# Patient Record
Sex: FEMALE | Race: WHITE
[De-identification: names, ages, dates, MRNs, and addresses within clinical notes are randomized per-mention and may not be internally consistent; named-entity substitution may affect disease eponyms.]

---

## 2020-02-17 ENCOUNTER — HOSPITAL ENCOUNTER (OUTPATIENT)
Dept: HOSPITAL 46 - DS | Age: 65
Discharge: HOME | End: 2020-02-17
Attending: SPECIALIST
Payer: COMMERCIAL

## 2020-02-17 VITALS — HEIGHT: 65 IN | BODY MASS INDEX: 34.99 KG/M2 | WEIGHT: 209.99 LBS

## 2020-02-17 DIAGNOSIS — Z91.041: ICD-10-CM

## 2020-02-17 DIAGNOSIS — S52.122A: Primary | ICD-10-CM

## 2020-02-17 DIAGNOSIS — Z91.040: ICD-10-CM

## 2020-02-17 DIAGNOSIS — S52.132A: ICD-10-CM

## 2020-02-17 DIAGNOSIS — Z88.8: ICD-10-CM

## 2020-02-17 DIAGNOSIS — Z79.82: ICD-10-CM
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
## 2020-02-17 DIAGNOSIS — Z79.899: ICD-10-CM

## 2020-02-17 PROCEDURE — 0PSJ04Z REPOSITION LEFT RADIUS WITH INTERNAL FIXATION DEVICE, OPEN APPROACH: ICD-10-PCS | Performed by: SPECIALIST

## 2020-02-17 PROCEDURE — A9270 NON-COVERED ITEM OR SERVICE: HCPCS

## 2020-02-17 NOTE — OR
[facility]
                                    [address], [place]  [address]
_________________________________________________________________________________________
                                                                 Signed   
 
 
DATE OF OPERATION:
02/17/2020
 
SURGEON:
Frederick Geronimo MD
 
PREOPERATIVE DIAGNOSIS:
Displaced radial head and neck fracture, left.
 
POSTOPERATIVE DIAGNOSIS:
Displaced radial head and neck fracture, left.
 
PROCEDURE PERFORMED:
Open reduction and internal fixation of left radial head.
 
ASSISTANT:
Malinda Landa PA-C.  Malinda was present and critical for all portions of
procedure. 
 
ANESTHESIA:
General.
 
TOURNIQUET TIME:
43 minutes.
 
IMPLANTS:
Synthes 2.4 radial neck plate with 6 screws.
 
BRIEF HISTORY:
Ignacia is a 64-year-old female who suffered a ground-level fall at work.  She had a
fracture of the radial head.  It was 1/3rd and displaced about 5 mm.  Risks, benefits,
and alternatives of surgery were discussed with her and she elected to proceed. 
 
DESCRIPTION OF PROCEDURE:
Once consent was obtained, she was taken to the operating room.  After adequate
anesthesia, she was placed on operating room table.  All downside pressure points well
padded.  The arm was prepped and draped in a standard sterile fashion.  A sterile
tourniquet was applied.  The arm was then exsanguinated and tourniquet inflated to 200
mmHg.  The radial head was then located using the image intensifier and 2.5-inch
incision was centered over that, and taken through skin and subcutaneous tissue.  The
fascia was divided longitudinally and the lateral elbow joint was entered.  There was
extensive subcutaneous edema and this was evacuated as we went.  The fracture was
 
    Electronically Signed By: FREDERICK GERONIMO MD  02/17/20 1241
_________________________________________________________________________________________
PATIENT NAME:     IGNACIA WILSON                      
MEDICAL RECORD #: [id number]            OPERATIVE REPORT              
          ACCT #: [id number]  
DATE OF BIRTH:   [birth year]            REPORT #: [id number]      
PHYSICIAN:        FREDERICK GERONIMO MD              
PCP:              NO PRIMARY CARE PHYSICIAN     
REPORT IS CONFIDENTIAL AND NOT TO BE RELEASED WITHOUT AUTHORIZATION
 
 
                                  [facility]
                                    [address], [place]  [address]
_________________________________________________________________________________________
                                                                 Signed   
 
 
depressed, but not greatly displaced from that.  It was then elevated carefully and
pinned using 1.25 K-wire.  The anterior aspect of the fracture was then fixed using a
subarticular 2.0 screw.  This was placed after setting the head deep into the cartilage.
 The plate was then placed posterior to this to keep it in the safe zone.  The plate was
held with two screws in the shaft and two locking screws were placed in the subarticular
area.  Another 2.0 screw was then placed right along the articular margin.  The final
radiograph showed excellent reduction and placement of screws.  She had full pronation
and supination with no catching or locking.  The wound was copiously irrigated with
antibiotic solution, closed with 2-0 Stratafix for the fascial layer, 2-0 for the
subcutaneous layer, and a ZipLine closure for the skin.  The wound was dressed with an
Acticoat dressing, Ace wrap, and she was placed back into her hinged elbow brace.  She
tolerated the procedure well.  All sponge, needle, and instrument counts correct. 
 
 
 
            ________________________________________
            Frederick Geronimo MD 
 
 
BA/MODL
Job #:  [id number]
DD:  02/17/2020 09:59:25
DT:  02/17/2020 10:43:47
 
 
Copies:                                
~
 
 
 
 
 
 
 
 
 
 
 
 
 
 
 
 
    Electronically Signed By: FREDERICK GERONIMO MD  02/17/20 1241
_________________________________________________________________________________________
PATIENT NAME:     IGNACIA WILSON                      
MEDICAL RECORD #: [id number]            OPERATIVE REPORT              
          ACCT #: [id number]  
DATE OF BIRTH:   [birth year]            REPORT #: [id number]      
PHYSICIAN:        FREDERICK GERONIMO MD              
PCP:              NO PRIMARY CARE PHYSICIAN     
REPORT IS CONFIDENTIAL AND NOT TO BE RELEASED WITHOUT AUTHORIZATION

## 2020-02-17 NOTE — NUR
02/17/20 1010 Lauren Palomares
1004- PT ARRIVES TO PACU AROUSABLE TO VOICE. PT FALLS INSTANTLY BACK
TO SLEEP WHEN NOT BEING STIMULATED. RESP EVEN AND UNLABORED. OXYGEN
SAT MID 90'S ON 6L VIA MASK. PT'S LEFT ARM ELEVATED ON A PILLOW AND AN
ICE PACK PLACED.